# Patient Record
Sex: FEMALE | Race: WHITE | NOT HISPANIC OR LATINO
[De-identification: names, ages, dates, MRNs, and addresses within clinical notes are randomized per-mention and may not be internally consistent; named-entity substitution may affect disease eponyms.]

---

## 2017-06-19 PROBLEM — Z00.00 ENCOUNTER FOR PREVENTIVE HEALTH EXAMINATION: Status: ACTIVE | Noted: 2017-06-19

## 2018-07-05 ENCOUNTER — APPOINTMENT (OUTPATIENT)
Dept: HEART AND VASCULAR | Facility: CLINIC | Age: 73
End: 2018-07-05
Payer: MEDICARE

## 2018-07-05 VITALS
HEART RATE: 98 BPM | HEIGHT: 64 IN | SYSTOLIC BLOOD PRESSURE: 118 MMHG | BODY MASS INDEX: 23.39 KG/M2 | WEIGHT: 137 LBS | DIASTOLIC BLOOD PRESSURE: 76 MMHG | OXYGEN SATURATION: 97 %

## 2018-07-05 DIAGNOSIS — I10 ESSENTIAL (PRIMARY) HYPERTENSION: ICD-10-CM

## 2018-07-05 DIAGNOSIS — Z78.9 OTHER SPECIFIED HEALTH STATUS: ICD-10-CM

## 2018-07-05 DIAGNOSIS — I25.10 ATHEROSCLEROTIC HEART DISEASE OF NATIVE CORONARY ARTERY W/OUT ANGINA PECTORIS: ICD-10-CM

## 2018-07-05 DIAGNOSIS — I25.5 ISCHEMIC CARDIOMYOPATHY: ICD-10-CM

## 2018-07-05 PROCEDURE — 93282 PRGRMG EVAL IMPLANTABLE DFB: CPT

## 2018-07-05 PROCEDURE — 99203 OFFICE O/P NEW LOW 30 MIN: CPT | Mod: 25

## 2018-07-05 RX ORDER — DIGOXIN 250 UG/1
250 TABLET ORAL
Refills: 0 | Status: ACTIVE | COMMUNITY
Start: 2018-07-05

## 2018-07-05 RX ORDER — LOSARTAN POTASSIUM 50 MG/1
50 TABLET, FILM COATED ORAL
Refills: 0 | Status: ACTIVE | COMMUNITY
Start: 2018-07-05

## 2018-07-05 RX ORDER — ASPIRIN 325 MG/1
325 TABLET, FILM COATED ORAL
Refills: 0 | Status: ACTIVE | COMMUNITY
Start: 2018-07-05

## 2018-07-05 RX ORDER — METOPROLOL SUCCINATE 100 MG/1
100 TABLET, EXTENDED RELEASE ORAL
Refills: 0 | Status: ACTIVE | COMMUNITY
Start: 2018-07-05

## 2018-07-05 RX ORDER — LEVOTHYROXINE SODIUM 0.03 MG/1
25 TABLET ORAL
Refills: 0 | Status: ACTIVE | COMMUNITY
Start: 2018-07-05

## 2018-07-05 RX ORDER — SIMVASTATIN 20 MG/1
20 TABLET, FILM COATED ORAL
Refills: 0 | Status: ACTIVE | COMMUNITY
Start: 2018-07-05

## 2018-07-05 RX ORDER — SPIRONOLACTONE 25 MG/1
25 TABLET ORAL
Refills: 0 | Status: ACTIVE | COMMUNITY
Start: 2018-07-05

## 2018-07-10 PROBLEM — I25.5 ISCHEMIC CARDIOMYOPATHY: Status: ACTIVE | Noted: 2018-07-10

## 2018-07-10 PROBLEM — I10 HTN (HYPERTENSION), BENIGN: Status: ACTIVE | Noted: 2018-07-10

## 2018-07-10 PROBLEM — Z78.9 NON-SMOKER: Status: ACTIVE | Noted: 2018-07-10

## 2018-07-10 PROBLEM — I25.10 CAD IN NATIVE ARTERY: Status: ACTIVE | Noted: 2018-07-10

## 2018-11-07 ENCOUNTER — APPOINTMENT (OUTPATIENT)
Dept: HEART AND VASCULAR | Facility: CLINIC | Age: 73
End: 2018-11-07
Payer: MEDICARE

## 2018-11-07 VITALS
HEIGHT: 64 IN | HEART RATE: 107 BPM | BODY MASS INDEX: 24.41 KG/M2 | DIASTOLIC BLOOD PRESSURE: 72 MMHG | SYSTOLIC BLOOD PRESSURE: 119 MMHG | WEIGHT: 143 LBS

## 2018-11-07 PROCEDURE — 93282 PRGRMG EVAL IMPLANTABLE DFB: CPT

## 2018-11-07 PROCEDURE — 99215 OFFICE O/P EST HI 40 MIN: CPT | Mod: 25

## 2018-11-14 ENCOUNTER — APPOINTMENT (OUTPATIENT)
Dept: HEART AND VASCULAR | Facility: CLINIC | Age: 73
End: 2018-11-14
Payer: MEDICARE

## 2018-11-14 VITALS
HEIGHT: 64 IN | SYSTOLIC BLOOD PRESSURE: 113 MMHG | WEIGHT: 143 LBS | BODY MASS INDEX: 24.41 KG/M2 | DIASTOLIC BLOOD PRESSURE: 68 MMHG | HEART RATE: 103 BPM

## 2018-11-14 PROCEDURE — 99215 OFFICE O/P EST HI 40 MIN: CPT | Mod: 25

## 2018-11-14 PROCEDURE — 93282 PRGRMG EVAL IMPLANTABLE DFB: CPT

## 2018-11-15 LAB
ANION GAP SERPL CALC-SCNC: 20 MMOL/L
BASOPHILS # BLD AUTO: 0.01 K/UL
BASOPHILS NFR BLD AUTO: 0.1 %
BUN SERPL-MCNC: 28 MG/DL
CALCIUM SERPL-MCNC: 10 MG/DL
CHLORIDE SERPL-SCNC: 103 MMOL/L
CO2 SERPL-SCNC: 20 MMOL/L
CREAT SERPL-MCNC: 1.41 MG/DL
EOSINOPHIL # BLD AUTO: 0.06 K/UL
EOSINOPHIL NFR BLD AUTO: 0.6 %
GLUCOSE SERPL-MCNC: 122 MG/DL
HCT VFR BLD CALC: 50.6 %
HGB BLD-MCNC: 16 G/DL
IMM GRANULOCYTES NFR BLD AUTO: 0.1 %
LYMPHOCYTES # BLD AUTO: 1.91 K/UL
LYMPHOCYTES NFR BLD AUTO: 19 %
MAN DIFF?: NORMAL
MCHC RBC-ENTMCNC: 30.7 PG
MCHC RBC-ENTMCNC: 31.6 GM/DL
MCV RBC AUTO: 96.9 FL
MONOCYTES # BLD AUTO: 1.02 K/UL
MONOCYTES NFR BLD AUTO: 10.1 %
NEUTROPHILS # BLD AUTO: 7.04 K/UL
NEUTROPHILS NFR BLD AUTO: 70.1 %
NT-PROBNP SERPL-MCNC: 2881 PG/ML
PLATELET # BLD AUTO: 271 K/UL
POTASSIUM SERPL-SCNC: 4.8 MMOL/L
RBC # BLD: 5.22 M/UL
RBC # FLD: 14 %
SODIUM SERPL-SCNC: 143 MMOL/L
WBC # FLD AUTO: 10.05 K/UL

## 2018-11-20 NOTE — DISCUSSION/SUMMARY
[AICD Function Normal] : normal AICD function [FreeTextEntry1] : Ms. Hood is a 73 year old female with HTN, HLD, hypothyroidism, CAD s/p stents and ischemic cardiomyopathy s/p ICD; who presents for follow up.  She complains of increased SOB and fatigue.  She has a known cardiomyopathy and a LBBB with sinus tachycardia on today's EKG.  SHe notes recent blood work and echocardiogram which I will request from Dr. Bai office.  If her labs are ok I would start her on a week of lasix and have her return to the office next week for an assessment.  If her EF is depressed and she has MR I told her I think she may benefit from an upgrade to a BIV ICD.  We discussed that resynchronization therapy could help the way she is feeling.  However, I would like to review her records prior to discussing this in more detail.  Once I obtain these records I will call her and let her know if we will start Lasix for a week.  She knows to call with any questions or concerns.

## 2018-11-20 NOTE — REVIEW OF SYSTEMS
[Feeling Fatigued] : feeling fatigued [see HPI] : see HPI [Shortness Of Breath] : shortness of breath [Dyspnea on exertion] : dyspnea during exertion [Negative] : Heme/Lymph [Fever] : no fever [Chills] : no chills [Chest  Pressure] : no chest pressure [Chest Pain] : no chest pain [Lower Ext Edema] : no extremity edema [Palpitations] : no palpitations [Cough] : no cough [Wheezing] : no wheezing [Coughing Up Blood] : no hemoptysis

## 2018-11-20 NOTE — HISTORY OF PRESENT ILLNESS
[SOB] : dyspnea [Palpitations] : no palpitations [Syncope] : no syncope [Dizziness] : no dizziness [Chest Pain] : no chest pain or discomfort [ICD Shocks] : no recent ICD shocks [Shoulder Pain] : no shoulder pain [Pain at Site] : no pain at device site [Erythema at Site] : no erythema at device site [Swelling at Site] : no swelling at device site [de-identified] : Ms. Hood is a 73 year old female with HTN, HLD, hypothyroidism, CAD s/p stents and ischemic cardiomyopathy s/p ICD; who presents for follow up\par \par She recently established care in our office.  She has a history of CAD and stents and since then was told she had a depressed EF and had an ICD placed in 2005.  \par \par She presents today for routine ICD check and notes that she has increased fatigue and SOB recently.  SHe saw her cardiologist and was told she had severe MR and has an appointment to see a valve doctor.  She states she also has an upcoming stress test and possible angiogram.  She notes some SOB at rest; but mostly with ambulation.  No palpitations, chest pain, syncope, near syncope, orthopnea or peripheral edema.  No device related complaints.  \par  \par \par

## 2018-11-20 NOTE — ADDENDUM
[FreeTextEntry1] : Left patient 2 voicemail and tried calling her another time - obtained records from Dr. Bai office and would like to start her on 20mg of lasix and have her come back into the office next week.  She was asked to call back at her earliest convenience.

## 2018-11-20 NOTE — PHYSICAL EXAM
[General Appearance - Well Developed] : well developed [Normal Appearance] : normal appearance [Well Groomed] : well groomed [General Appearance - Well Nourished] : well nourished [No Deformities] : no deformities [General Appearance - In No Acute Distress] : no acute distress [Respiration, Rhythm And Depth] : normal respiratory rhythm and effort [Exaggerated Use Of Accessory Muscles For Inspiration] : no accessory muscle use [Auscultation Breath Sounds / Voice Sounds] : lungs were clear to auscultation bilaterally [Left Infraclavicular] : left infraclavicular area [Clean] : clean [Dry] : dry [Well-Healed] : well-healed [] : no ischemic changes [FreeTextEntry1] : sinus with rates to 110 [Palpable Crepitus] : no palpable crepitus [Bleeding] : no active bleeding [Foul Odor] : no foul smell [Purulent Drainage] : no purulent drainage [Serosanguineous Drainage] : no serosanquineous drainage [Serous Drainage] : no serous drainage [Erythema] : not erythematous [Warm] : not warm [Tender] : not tender [Indurated] : not indurated [Fluctuant] : not fluctuant

## 2018-11-20 NOTE — PROCEDURE
[No] : not [ICD] : Implantable cardioverter-defibrillator [VVI] : VVI [Voltage: ___ volts] : Voltage was [unfilled] volts [Threshold Testing Performed] : Threshold testing was performed [Lead Imp:  ___ohms] : lead impedance was [unfilled] ohms [Sensing Amplitude ___mv] : sensing amplitude was [unfilled] mv [___V @] : [unfilled] V [___ ms] : [unfilled] ms [None] : none [de-identified] : sinus tachycardia  [de-identified] : Medtronic [de-identified] : Secura [de-identified] : [id number] [de-identified] : 11/2005 [de-identified] : 40 [de-identified] : shock impedance 42 [de-identified] : no events\par  <0.1%

## 2018-11-21 NOTE — DISCUSSION/SUMMARY
[AICD Function Normal] : normal AICD function [FreeTextEntry1] : Ms. Hood is a 73 year old female with HTN, HLD, hypothyroidism, CAD s/p stents and ischemic cardiomyopathy s/p ICD, LBB; who presents for follow up.  She complains of LE edema, SOB and fatigue.  She has a known cardiomyopathy, LBBB and mitral regurgitation.  I discussed this with her cardiologist and I believe she would benefit from an upgrade to a BIV ICD.  I am hopeful that this will improve her MR; but I have told her that we will not know this until we place the LV lead and see how she does.  She is agreeable to proceeding with a BiV upgrade and we discussed the procedure in detail including risks, benefits and alternatives.  GIven her worsening CHF symptoms I have asked her to get blood work and will increase her lasix pending these results.   I have asked her to eliminate salt from her diet and watch her fluid intake.  I have asked her to weigh herself daily and monitor her lower extremity edema.  IF she does not notice that she is urinating more with lasix she will need to see Dr. Marroquin or come back to my office to reassess her medications.   She knows to call with any questions or concerns.

## 2018-11-21 NOTE — PROCEDURE
[No] : not [ICD] : Implantable cardioverter-defibrillator [VVI] : VVI [Voltage: ___ volts] : Voltage was [unfilled] volts [Threshold Testing Performed] : Threshold testing was performed [Lead Imp:  ___ohms] : lead impedance was [unfilled] ohms [Sensing Amplitude ___mv] : sensing amplitude was [unfilled] mv [___V @] : [unfilled] V [___ ms] : [unfilled] ms [None] : none [de-identified] : sinus tachycardia  [de-identified] : Medtronic [de-identified] : Secura [de-identified] : [id number] [de-identified] : 11/2005 [de-identified] : 40 [de-identified] : shock impedance 44 [de-identified] : no events\par  <0.1%

## 2018-11-21 NOTE — HISTORY OF PRESENT ILLNESS
[SOB] : dyspnea [Palpitations] : no palpitations [Syncope] : no syncope [Dizziness] : no dizziness [Chest Pain] : no chest pain or discomfort [ICD Shocks] : no recent ICD shocks [Shoulder Pain] : no shoulder pain [Pain at Site] : no pain at device site [Erythema at Site] : no erythema at device site [Swelling at Site] : no swelling at device site [de-identified] : Ms. Hood is a 73 year old female with HTN, HLD, hypothyroidism, CAD s/p stents and ischemic cardiomyopathy s/p ICD and LBBB; who presents for follow up\par \par She recently established care in our office.  She has a history of CAD and stents and since then was told she had a depressed EF and had an ICD placed in 2005.  \par \par She presented last week for routine ICD check and notes that she has increased fatigue and SOB recently.  SHe saw her cardiologist and was told she had severe MR and has an appointment to see a valve doctor.  She states she also has an upcoming stress test.  She was found to have a new LBBB and lower extremity edema.  We put her on one week of lasix and obtained her echocardiogram to see if she would be a candidate for a BiV upgrade.  \par \par She states since starting the lasix she notes more LE edema.  She still has SOB at rest; but mostly with ambulation.  No palpitations, chest pain, syncope, near syncope, orthopnea.  No device related complaints.  \par  \par

## 2018-11-21 NOTE — PHYSICAL EXAM
[General Appearance - Well Developed] : well developed [Normal Appearance] : normal appearance [Well Groomed] : well groomed [General Appearance - Well Nourished] : well nourished [No Deformities] : no deformities [General Appearance - In No Acute Distress] : no acute distress [Respiration, Rhythm And Depth] : normal respiratory rhythm and effort [Exaggerated Use Of Accessory Muscles For Inspiration] : no accessory muscle use [Auscultation Breath Sounds / Voice Sounds] : lungs were clear to auscultation bilaterally [Left Infraclavicular] : left infraclavicular area [Clean] : clean [Dry] : dry [Well-Healed] : well-healed [] : no ischemic changes [Abdomen Soft] : soft [Cyanosis, Localized] : no localized cyanosis [FreeTextEntry1] : sinus with rates to 110 [Palpable Crepitus] : no palpable crepitus [Bleeding] : no active bleeding [Foul Odor] : no foul smell [Purulent Drainage] : no purulent drainage [Serosanguineous Drainage] : no serosanquineous drainage [Serous Drainage] : no serous drainage [Erythema] : not erythematous [Warm] : not warm [Tender] : not tender [Indurated] : not indurated [Fluctuant] : not fluctuant

## 2018-11-21 NOTE — REVIEW OF SYSTEMS
[Feeling Fatigued] : feeling fatigued [see HPI] : see HPI [Shortness Of Breath] : shortness of breath [Dyspnea on exertion] : dyspnea during exertion [Lower Ext Edema] : lower extremity edema [Negative] : Psychiatric [Fever] : no fever [Chills] : no chills [Chest  Pressure] : no chest pressure [Chest Pain] : no chest pain [Palpitations] : no palpitations [Cough] : no cough [Wheezing] : no wheezing [Coughing Up Blood] : no hemoptysis

## 2018-11-26 ENCOUNTER — INPATIENT (INPATIENT)
Facility: HOSPITAL | Age: 73
LOS: 0 days | Discharge: HOME | DRG: 223 | End: 2018-11-27
Attending: INTERNAL MEDICINE | Admitting: INTERNAL MEDICINE
Payer: COMMERCIAL

## 2018-11-26 VITALS
DIASTOLIC BLOOD PRESSURE: 85 MMHG | RESPIRATION RATE: 18 BRPM | OXYGEN SATURATION: 100 % | SYSTOLIC BLOOD PRESSURE: 130 MMHG | HEART RATE: 60 BPM

## 2018-11-26 DIAGNOSIS — I42.9 CARDIOMYOPATHY, UNSPECIFIED: ICD-10-CM

## 2018-11-26 DIAGNOSIS — Z95.810 PRESENCE OF AUTOMATIC (IMPLANTABLE) CARDIAC DEFIBRILLATOR: Chronic | ICD-10-CM

## 2018-11-26 PROCEDURE — 99223 1ST HOSP IP/OBS HIGH 75: CPT

## 2018-11-26 PROCEDURE — 93641 EP EVL 1/2CHMB PAC CVDFB TST: CPT | Mod: 26

## 2018-11-26 PROCEDURE — 33225 L VENTRIC PACING LEAD ADD-ON: CPT

## 2018-11-26 PROCEDURE — 33249 INSJ/RPLCMT DEFIB W/LEAD(S): CPT

## 2018-11-26 RX ORDER — ACETAMINOPHEN 500 MG
325 TABLET ORAL EVERY 4 HOURS
Qty: 0 | Refills: 0 | Status: DISCONTINUED | OUTPATIENT
Start: 2018-11-26 | End: 2018-11-27

## 2018-11-26 RX ORDER — FUROSEMIDE 40 MG
1 TABLET ORAL
Qty: 0 | Refills: 0 | COMMUNITY

## 2018-11-26 RX ORDER — DIGOXIN 250 MCG
1 TABLET ORAL
Qty: 0 | Refills: 0 | COMMUNITY

## 2018-11-26 RX ORDER — SIMVASTATIN 20 MG/1
20 TABLET, FILM COATED ORAL DAILY
Qty: 0 | Refills: 0 | Status: DISCONTINUED | OUTPATIENT
Start: 2018-11-26 | End: 2018-11-27

## 2018-11-26 RX ORDER — ASPIRIN/CALCIUM CARB/MAGNESIUM 324 MG
325 TABLET ORAL EVERY 24 HOURS
Qty: 0 | Refills: 0 | Status: DISCONTINUED | OUTPATIENT
Start: 2018-11-26 | End: 2018-11-27

## 2018-11-26 RX ORDER — CLOPIDOGREL BISULFATE 75 MG/1
75 TABLET, FILM COATED ORAL DAILY
Qty: 0 | Refills: 0 | Status: DISCONTINUED | OUTPATIENT
Start: 2018-11-26 | End: 2018-11-27

## 2018-11-26 RX ORDER — LEVOTHYROXINE SODIUM 125 MCG
25 TABLET ORAL DAILY
Qty: 0 | Refills: 0 | Status: DISCONTINUED | OUTPATIENT
Start: 2018-11-26 | End: 2018-11-27

## 2018-11-26 RX ORDER — CEFAZOLIN SODIUM 1 G
2000 VIAL (EA) INJECTION EVERY 8 HOURS
Qty: 0 | Refills: 0 | Status: COMPLETED | OUTPATIENT
Start: 2018-11-26 | End: 2018-11-27

## 2018-11-26 RX ORDER — DIGOXIN 250 MCG
0.25 TABLET ORAL DAILY
Qty: 0 | Refills: 0 | Status: DISCONTINUED | OUTPATIENT
Start: 2018-11-26 | End: 2018-11-27

## 2018-11-26 RX ORDER — METOPROLOL TARTRATE 50 MG
100 TABLET ORAL DAILY
Qty: 0 | Refills: 0 | Status: DISCONTINUED | OUTPATIENT
Start: 2018-11-26 | End: 2018-11-27

## 2018-11-26 RX ORDER — SPIRONOLACTONE 25 MG/1
1 TABLET, FILM COATED ORAL
Qty: 0 | Refills: 0 | COMMUNITY

## 2018-11-26 RX ORDER — ASPIRIN/CALCIUM CARB/MAGNESIUM 324 MG
1 TABLET ORAL
Qty: 0 | Refills: 0 | COMMUNITY

## 2018-11-26 RX ORDER — SIMVASTATIN 20 MG/1
1 TABLET, FILM COATED ORAL
Qty: 0 | Refills: 0 | COMMUNITY

## 2018-11-26 RX ORDER — METOPROLOL TARTRATE 50 MG
1 TABLET ORAL
Qty: 0 | Refills: 0 | COMMUNITY

## 2018-11-26 RX ORDER — CLOPIDOGREL BISULFATE 75 MG/1
1 TABLET, FILM COATED ORAL
Qty: 0 | Refills: 0 | COMMUNITY

## 2018-11-26 RX ORDER — FUROSEMIDE 40 MG
40 TABLET ORAL DAILY
Qty: 0 | Refills: 0 | Status: DISCONTINUED | OUTPATIENT
Start: 2018-11-26 | End: 2018-11-27

## 2018-11-26 RX ORDER — SPIRONOLACTONE 25 MG/1
25 TABLET, FILM COATED ORAL DAILY
Qty: 0 | Refills: 0 | Status: DISCONTINUED | OUTPATIENT
Start: 2018-11-26 | End: 2018-11-27

## 2018-11-26 RX ORDER — LOSARTAN POTASSIUM 100 MG/1
1 TABLET, FILM COATED ORAL
Qty: 0 | Refills: 0 | COMMUNITY

## 2018-11-26 RX ORDER — LEVOTHYROXINE SODIUM 125 MCG
1 TABLET ORAL
Qty: 0 | Refills: 0 | COMMUNITY

## 2018-11-26 RX ORDER — CEFAZOLIN SODIUM 1 G
1000 VIAL (EA) INJECTION ONCE
Qty: 0 | Refills: 0 | Status: DISCONTINUED | OUTPATIENT
Start: 2018-11-26 | End: 2018-11-27

## 2018-11-26 RX ORDER — LOSARTAN POTASSIUM 100 MG/1
50 TABLET, FILM COATED ORAL DAILY
Qty: 0 | Refills: 0 | Status: DISCONTINUED | OUTPATIENT
Start: 2018-11-26 | End: 2018-11-27

## 2018-11-26 RX ADMIN — SIMVASTATIN 20 MILLIGRAM(S): 20 TABLET, FILM COATED ORAL at 20:53

## 2018-11-26 RX ADMIN — Medication 100 MILLIGRAM(S): at 20:53

## 2018-11-26 NOTE — H&P ADULT - ASSESSMENT
Ms. Hood is a 73 year old female with HTN, HLD, hypothyroidism, CAD s/p stents, MR and ischemic cardiomyopathy s/p ICD and LBBB; who presents for elective upgrade to a BiV ICD.

## 2018-11-26 NOTE — H&P ADULT - HISTORY OF PRESENT ILLNESS
Ms. Hood is a 73 year old female with HTN, HLD, hypothyroidism, CAD s/p stents, MR and ischemic cardiomyopathy s/p ICD and LBBB; who presents for elective upgrade to a BiV ICD.     She recently established care in our office. She has a history of CAD and stents and since then was told she had a depressed EF and had an ICD placed in 2005.     She presented last month to the office for a routine ICD check and notes that she has increased fatigue and SOB recently. She saw her cardiologist and was told she had severe MR and has an appointment to see a valve doctor.  She was found to have a new LBBB and lower extremity edema. She was initially placed on 20mg of Lasix a day without a significant improvement in the way she felt.  This was increased to 40mg daily and she notes an improvement in her breathing and LE edema.      No palpitations, chest pain, syncope, near syncope, orthopnea. No device related complaints. She now presents for elective upgrade to a BIV ICD.       Last office device check 11/14/18  Procedure  Device Check The patient is not pacemaker dependent. sinus tachycardia   Device Type: Implantable cardioverter-defibrillator   Pacemaker/ICD : [employer].   Model: Secura. Serial Number: [id number]. Date of Implant: 11/2005.   Mode: VVI. Rate: 40.   Battery Status: Voltage was 2.63 volts   Measurement: Threshold testing was performed.   Rt Ventricle:. lead impedance was 342 ohms. sensing amplitude was 8.4 mv. Pacing Threshold: 0.75 V @ 0.4 ms.   Program Changes: none and shock impedance 44.   Comments:. no events   <0.1

## 2018-11-27 ENCOUNTER — TRANSCRIPTION ENCOUNTER (OUTPATIENT)
Age: 73
End: 2018-11-27

## 2018-11-27 VITALS — TEMPERATURE: 97 F

## 2018-11-27 LAB
ANION GAP SERPL CALC-SCNC: 12 MMOL/L — SIGNIFICANT CHANGE UP (ref 5–17)
BUN SERPL-MCNC: 30 MG/DL — HIGH (ref 7–23)
CALCIUM SERPL-MCNC: 9 MG/DL — SIGNIFICANT CHANGE UP (ref 8.4–10.5)
CHLORIDE SERPL-SCNC: 106 MMOL/L — SIGNIFICANT CHANGE UP (ref 96–108)
CO2 SERPL-SCNC: 24 MMOL/L — SIGNIFICANT CHANGE UP (ref 22–31)
CREAT SERPL-MCNC: 1.24 MG/DL — SIGNIFICANT CHANGE UP (ref 0.5–1.3)
GLUCOSE SERPL-MCNC: 89 MG/DL — SIGNIFICANT CHANGE UP (ref 70–99)
HCT VFR BLD CALC: 38.7 % — SIGNIFICANT CHANGE UP (ref 34.5–45)
HGB BLD-MCNC: 12.4 G/DL — SIGNIFICANT CHANGE UP (ref 11.5–15.5)
MCHC RBC-ENTMCNC: 31.2 PG — SIGNIFICANT CHANGE UP (ref 27–34)
MCHC RBC-ENTMCNC: 32 G/DL — SIGNIFICANT CHANGE UP (ref 32–36)
MCV RBC AUTO: 97.2 FL — SIGNIFICANT CHANGE UP (ref 80–100)
PLATELET # BLD AUTO: 147 K/UL — LOW (ref 150–400)
POTASSIUM SERPL-MCNC: 5.1 MMOL/L — SIGNIFICANT CHANGE UP (ref 3.5–5.3)
POTASSIUM SERPL-SCNC: 5.1 MMOL/L — SIGNIFICANT CHANGE UP (ref 3.5–5.3)
RBC # BLD: 3.98 M/UL — SIGNIFICANT CHANGE UP (ref 3.8–5.2)
RBC # FLD: 13.1 % — SIGNIFICANT CHANGE UP (ref 10.3–16.9)
SODIUM SERPL-SCNC: 142 MMOL/L — SIGNIFICANT CHANGE UP (ref 135–145)
WBC # BLD: 8.9 K/UL — SIGNIFICANT CHANGE UP (ref 3.8–10.5)
WBC # FLD AUTO: 8.9 K/UL — SIGNIFICANT CHANGE UP (ref 3.8–10.5)

## 2018-11-27 PROCEDURE — 99239 HOSP IP/OBS DSCHRG MGMT >30: CPT

## 2018-11-27 PROCEDURE — 71046 X-RAY EXAM CHEST 2 VIEWS: CPT | Mod: 26

## 2018-11-27 RX ADMIN — CLOPIDOGREL BISULFATE 75 MILLIGRAM(S): 75 TABLET, FILM COATED ORAL at 10:25

## 2018-11-27 RX ADMIN — SPIRONOLACTONE 25 MILLIGRAM(S): 25 TABLET, FILM COATED ORAL at 06:10

## 2018-11-27 RX ADMIN — Medication 25 MICROGRAM(S): at 06:10

## 2018-11-27 RX ADMIN — LOSARTAN POTASSIUM 50 MILLIGRAM(S): 100 TABLET, FILM COATED ORAL at 06:10

## 2018-11-27 RX ADMIN — Medication 325 MILLIGRAM(S): at 06:10

## 2018-11-27 RX ADMIN — Medication 100 MILLIGRAM(S): at 06:10

## 2018-11-27 RX ADMIN — Medication 40 MILLIGRAM(S): at 06:10

## 2018-11-27 RX ADMIN — Medication 0.25 MILLIGRAM(S): at 06:10

## 2018-11-27 NOTE — DISCHARGE NOTE ADULT - CARE PLAN
Principal Discharge DX:	ICD (implantable cardioverter-defibrillator) in place  Goal:	s/p BIV upgrade  Assessment and plan of treatment:	As discussed bruising is normal however if you notice any worsening firmness at site or any bleeding, discharge or issues at site or fever/chills please call [phone].  You can get the incision wet tomorrow but please do not pick at or rub off the glue.  Try to avoid lifting your L arm above your shoulders or any heavy lifting with your L arm

## 2018-11-27 NOTE — DISCHARGE NOTE ADULT - HOSPITAL COURSE
Ms. Hood is a 73 year old female with HTN, HLD, hypothyroidism, CAD s/p stents, MR and ischemic cardiomyopathy s/p ICD and LBBB; who presents for elective upgrade to a BiV ICD.   She underwent a successful BIV upgrade on 11/26.  Post procedure she developed a hematoma and a pressure dressing was placed overnight.  This was removed in elaine am and she has a small residual hematoma that is stable with + ecchymosis  She did well overnight.  Tele paced with some NSR with RV pacing and PVCs.  Lungs CTAB.  S1S2 RRR.  CXR done and no PNX seen (d/w radiology).  No changes in her medications.  She was given explicit instructions about activity limitations over the next few weeks and noted understanding.  She is stable for discharge and has a follow up appointment    [facility]   [id number]  presenting rhythm APVP  she is not ppm dependant  Paced 97%  battery initializing   DDD 60/120  RA sense 3.6, impedance 418, threshold 0.25V@0.4ms  RV sense 7.8mV, impedance 342, threshold 0.5V@0.4ms  shock impedance 34  LV impedance 361, threshold 1V@0.4ms  no events   no parameter changes

## 2018-11-27 NOTE — DISCHARGE NOTE ADULT - PLAN OF CARE
s/p BIV upgrade As discussed bruising is normal however if you notice any worsening firmness at site or any bleeding, discharge or issues at site or fever/chills please call [phone].  You can get the incision wet tomorrow but please do not pick at or rub off the glue.  Try to avoid lifting your L arm above your shoulders or any heavy lifting with your L arm

## 2018-11-27 NOTE — DISCHARGE NOTE ADULT - CARE PROVIDER_API CALL
Akshat Gooden), Cardiac Electrophysiology; Cardiology; Cardiovascular Disease  [address] [place] [address]  Phone: [phone]  Fax: [phone]

## 2018-11-27 NOTE — DISCHARGE NOTE ADULT - MEDICATION SUMMARY - MEDICATIONS TO TAKE
I will START or STAY ON the medications listed below when I get home from the hospital:    spironolactone 25 mg oral tablet  -- 1 tab(s) by mouth once a day  -- Indication: For Cardiomyopathy    aspirin 325 mg oral tablet  -- 1 tab(s) by mouth once a day  -- Indication: For CAD (coronary artery disease)    losartan 50 mg oral tablet  -- 1 tab(s) by mouth once a day  -- Indication: For Cardiomyopathy    digoxin 250 mcg (0.25 mg) oral tablet  -- 1 tab(s) by mouth once a day  -- Indication: For Cardiomyopathy    Zocor 20 mg oral tablet  -- 1 tab(s) by mouth once a day (at bedtime)  -- Indication: For Hyperlipidemia    Plavix 75 mg oral tablet  -- 1 tab(s) by mouth once a day  -- Indication: For CAD (coronary artery disease)    Toprol- mg oral tablet, extended release  -- 1 tab(s) by mouth once a day  -- Indication: For CAD (coronary artery disease)    Lasix 40 mg oral tablet  -- 1 tab(s) by mouth once a day  -- Indication: For Cardiomyopathy    levothyroxine 25 mcg (0.025 mg) oral tablet  -- 1 tab(s) by mouth once a day  -- Indication: For Hypothyroid

## 2018-11-27 NOTE — DISCHARGE NOTE ADULT - INSTRUCTIONS
you should follow a low sodium diet (2-3 grams daily) restrict your fluid intake to 1.5L a day.  Weight yourself every day at the same time and if you gain more then 2lbs in 1 day or 5 lbs in 1 week or you notice your legs swelling please call Dr. Marroquin or our office.

## 2018-11-27 NOTE — DISCHARGE NOTE ADULT - PATIENT PORTAL LINK FT
You can access the [facility], offered by [facility], by registering with the following website: [email or web address]

## 2018-11-28 ENCOUNTER — TRANSCRIPTION ENCOUNTER (OUTPATIENT)
Age: 73
End: 2018-11-28

## 2018-12-04 DIAGNOSIS — Z91.018 ALLERGY TO OTHER FOODS: ICD-10-CM

## 2018-12-04 DIAGNOSIS — I34.0 NONRHEUMATIC MITRAL (VALVE) INSUFFICIENCY: ICD-10-CM

## 2018-12-04 DIAGNOSIS — Z79.82 LONG TERM (CURRENT) USE OF ASPIRIN: ICD-10-CM

## 2018-12-04 DIAGNOSIS — I11.0 HYPERTENSIVE HEART DISEASE WITH HEART FAILURE: ICD-10-CM

## 2018-12-04 DIAGNOSIS — L76.32 POSTPROCEDURAL HEMATOMA OF SKIN AND SUBCUTANEOUS TISSUE FOLLOWING OTHER PROCEDURE: ICD-10-CM

## 2018-12-04 DIAGNOSIS — I42.8 OTHER CARDIOMYOPATHIES: ICD-10-CM

## 2018-12-04 DIAGNOSIS — I44.7 LEFT BUNDLE-BRANCH BLOCK, UNSPECIFIED: ICD-10-CM

## 2018-12-04 DIAGNOSIS — I50.9 HEART FAILURE, UNSPECIFIED: ICD-10-CM

## 2018-12-04 DIAGNOSIS — E03.9 HYPOTHYROIDISM, UNSPECIFIED: ICD-10-CM
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
## 2018-12-04 DIAGNOSIS — E78.5 HYPERLIPIDEMIA, UNSPECIFIED: ICD-10-CM
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
## 2018-12-04 DIAGNOSIS — Z95.5 PRESENCE OF CORONARY ANGIOPLASTY IMPLANT AND GRAFT: ICD-10-CM

## 2018-12-04 DIAGNOSIS — I25.10 ATHEROSCLEROTIC HEART DISEASE OF NATIVE CORONARY ARTERY WITHOUT ANGINA PECTORIS: ICD-10-CM

## 2018-12-04 DIAGNOSIS — Z79.02 LONG TERM (CURRENT) USE OF ANTITHROMBOTICS/ANTIPLATELETS: ICD-10-CM

## 2018-12-15 PROCEDURE — 71046 X-RAY EXAM CHEST 2 VIEWS: CPT

## 2018-12-15 PROCEDURE — C1730: CPT

## 2018-12-15 PROCEDURE — C1892: CPT

## 2018-12-15 PROCEDURE — C1769: CPT

## 2018-12-15 PROCEDURE — C1882: CPT

## 2018-12-15 PROCEDURE — C1889: CPT

## 2018-12-15 PROCEDURE — C1898: CPT

## 2018-12-15 PROCEDURE — 85027 COMPLETE CBC AUTOMATED: CPT

## 2018-12-15 PROCEDURE — 80048 BASIC METABOLIC PNL TOTAL CA: CPT

## 2018-12-15 PROCEDURE — C1894: CPT

## 2018-12-15 PROCEDURE — 36415 COLL VENOUS BLD VENIPUNCTURE: CPT

## 2018-12-15 PROCEDURE — C1900: CPT

## 2018-12-20 ENCOUNTER — APPOINTMENT (OUTPATIENT)
Dept: HEART AND VASCULAR | Facility: CLINIC | Age: 73
End: 2018-12-20
Payer: MEDICARE

## 2018-12-20 ENCOUNTER — NON-APPOINTMENT (OUTPATIENT)
Age: 73
End: 2018-12-20

## 2018-12-20 VITALS
HEIGHT: 64 IN | BODY MASS INDEX: 21.51 KG/M2 | DIASTOLIC BLOOD PRESSURE: 67 MMHG | WEIGHT: 126 LBS | HEART RATE: 81 BPM | SYSTOLIC BLOOD PRESSURE: 124 MMHG

## 2018-12-20 PROCEDURE — 93284 PRGRMG EVAL IMPLANTABLE DFB: CPT

## 2019-01-04 NOTE — HISTORY OF PRESENT ILLNESS
[Palpitations] : no palpitations [SOB] : no dyspnea [Syncope] : no syncope [Dizziness] : no dizziness [Chest Pain] : no chest pain or discomfort [ICD Shocks] : no recent ICD shocks [Shoulder Pain] : no shoulder pain [Pain at Site] : no pain at device site [Erythema at Site] : no erythema at device site [Swelling at Site] : no swelling at device site [de-identified] : Ms. Hood is a 73 year old female with HTN, HLD, hypothyroidism, CAD s/p stents and ischemic cardiomyopathy s/p ICD and LBBB s/p upgrade to a BiV ICD 11/26/18 and now presents for follow up\par \par She has a history of CAD and stents and since then was told she had a depressed EF and had an ICD placed in 2005.  She was seen in our office 10/18 for a routine ICD check and notes that she has increased fatigue and SOB.  SHe saw her cardiologist and was told she had severe MR and has an appointment to see a valve doctor.  She was found to have a new LBBB and lower extremity edema.  We put her on one week of lasix and we obtained her echocardiogram to see if she would be a candidate for a BiV upgrade.  \par \par She underwent a upgrade to a BIV ICD 11/2018 and since discharge from the hospital she notes more energy, less SOB and less LE edema.  Her lasix dose has been decrease and overall she notes a significant improvement.  No device related complaints.  No syncope, near syncope, palpitations or orthopnea.  \par  \par

## 2019-01-04 NOTE — REVIEW OF SYSTEMS
[see HPI] : see HPI [Negative] : Heme/Lymph [Fever] : no fever [Chills] : no chills [Feeling Fatigued] : not feeling fatigued [Shortness Of Breath] : no shortness of breath [Dyspnea on exertion] : not dyspnea during exertion [Chest  Pressure] : no chest pressure [Chest Pain] : no chest pain [Lower Ext Edema] : no extremity edema [Palpitations] : no palpitations [Cough] : no cough [Wheezing] : no wheezing [Coughing Up Blood] : no hemoptysis

## 2019-01-04 NOTE — PROCEDURE
[No] : not [VVI] : VVI [Threshold Testing Performed] : Threshold testing was performed [None] : none [NSR] : normal sinus rhythm [CRT-D] : Cardiac resynchronization therapy defibrillator [Sensing Amplitude ___mv] : sensing amplitude was [unfilled] mv [Lead Imp:  ___ohms] : lead impedance was [unfilled] ohms [___V @] : [unfilled] V [___ ms] : [unfilled] ms [Asense-Vsense ___ %] : Asense-Vsense [unfilled]% [Asense-Vpace ___ %] : Asense-Vpace [unfilled]% [Apace-Vsense ___ %] : Apace-Vsense [unfilled]% [Apace-Vpace ___ %] : Apace-Vpace [unfilled]% [de-identified] : Medtronic [de-identified] : Sasha NAVAS [de-identified] : [id number] [de-identified] : 11/26/18 [de-identified] : 40 [de-identified] : 9.2 years [de-identified] : shock impedance 41 [de-identified] : no events\par

## 2019-01-04 NOTE — DISCUSSION/SUMMARY
[AICD Function Normal] : normal AICD function [FreeTextEntry1] : Ms. Hood is a 73 year old female with HTN, HLD, hypothyroidism, CAD s/p stents and ischemic cardiomyopathy s/p ICD and LBBB s/p upgrade to a BiV ICD 11/26/18 and now presents for follow up.  Device incision is well healed.  INterrogation reveals normal function and all measured data is within normal limits.  She notes a significant improvement in the way she is feeling and has clear lungs, less peripheral edema on exam today.  She is no longer in sinus tachycardia; which had been her presenting rhythm on her last few visits.  I told her I am hopeful that she will continue to do well and we will see an improvement in her EF.  I have asked her to get an echocardiogram with her primary cardiologist in 2-3 months to evaluate this.  She will follow up with me in 4 months or sooner if needed.  At that time we will decrease outputs.  She knows to call with any questions or concerns.

## 2019-01-04 NOTE — PHYSICAL EXAM
[General Appearance - Well Developed] : well developed [Normal Appearance] : normal appearance [Well Groomed] : well groomed [General Appearance - Well Nourished] : well nourished [No Deformities] : no deformities [General Appearance - In No Acute Distress] : no acute distress [Respiration, Rhythm And Depth] : normal respiratory rhythm and effort [Exaggerated Use Of Accessory Muscles For Inspiration] : no accessory muscle use [Auscultation Breath Sounds / Voice Sounds] : lungs were clear to auscultation bilaterally [Left Infraclavicular] : left infraclavicular area [Clean] : clean [Dry] : dry [Well-Healed] : well-healed [] : no ischemic changes [Heart Rate And Rhythm] : heart rate and rhythm were normal [Heart Sounds] : normal S1 and S2 [FreeTextEntry1] : anxious [Palpable Crepitus] : no palpable crepitus [Bleeding] : no active bleeding [Foul Odor] : no foul smell [Purulent Drainage] : no purulent drainage [Serosanguineous Drainage] : no serosanquineous drainage [Serous Drainage] : no serous drainage [Erythema] : not erythematous [Warm] : not warm [Tender] : not tender [Indurated] : not indurated [Fluctuant] : not fluctuant

## 2019-03-04 ENCOUNTER — TRANSCRIPTION ENCOUNTER (OUTPATIENT)
Age: 74
End: 2019-03-04

## 2019-03-15 PROBLEM — I25.10 ATHEROSCLEROTIC HEART DISEASE OF NATIVE CORONARY ARTERY WITHOUT ANGINA PECTORIS: Chronic | Status: ACTIVE | Noted: 2018-11-26

## 2019-03-15 PROBLEM — E03.9 HYPOTHYROIDISM, UNSPECIFIED: Chronic | Status: ACTIVE | Noted: 2018-11-26

## 2019-03-15 PROBLEM — I42.9 CARDIOMYOPATHY, UNSPECIFIED: Chronic | Status: ACTIVE | Noted: 2018-11-26

## 2019-03-15 PROBLEM — I34.0 NONRHEUMATIC MITRAL (VALVE) INSUFFICIENCY: Chronic | Status: ACTIVE | Noted: 2018-11-26

## 2019-04-03 ENCOUNTER — APPOINTMENT (OUTPATIENT)
Dept: HEART AND VASCULAR | Facility: CLINIC | Age: 74
End: 2019-04-03
Payer: MEDICARE

## 2019-04-03 VITALS
DIASTOLIC BLOOD PRESSURE: 61 MMHG | BODY MASS INDEX: 22.2 KG/M2 | WEIGHT: 130 LBS | HEIGHT: 64 IN | HEART RATE: 63 BPM | SYSTOLIC BLOOD PRESSURE: 115 MMHG

## 2019-04-03 DIAGNOSIS — I48.0 PAROXYSMAL ATRIAL FIBRILLATION: ICD-10-CM

## 2019-04-03 PROCEDURE — 93284 PRGRMG EVAL IMPLANTABLE DFB: CPT

## 2019-04-03 PROCEDURE — 99214 OFFICE O/P EST MOD 30 MIN: CPT | Mod: 25

## 2019-04-03 RX ORDER — CLOPIDOGREL 75 MG/1
75 TABLET, FILM COATED ORAL
Refills: 0 | Status: DISCONTINUED | COMMUNITY
Start: 2018-07-05 | End: 2019-04-03

## 2019-04-10 LAB
ANION GAP SERPL CALC-SCNC: 12 MMOL/L
BASOPHILS # BLD AUTO: 0.02 K/UL
BASOPHILS NFR BLD AUTO: 0.2 %
BUN SERPL-MCNC: 25 MG/DL
CALCIUM SERPL-MCNC: 10.4 MG/DL
CHLORIDE SERPL-SCNC: 104 MMOL/L
CO2 SERPL-SCNC: 25 MMOL/L
CREAT SERPL-MCNC: 1.19 MG/DL
EOSINOPHIL # BLD AUTO: 0.31 K/UL
EOSINOPHIL NFR BLD AUTO: 3.3 %
GLUCOSE SERPL-MCNC: 96 MG/DL
HCT VFR BLD CALC: 42.4 %
HGB BLD-MCNC: 13.6 G/DL
IMM GRANULOCYTES NFR BLD AUTO: 0.2 %
LYMPHOCYTES # BLD AUTO: 2.04 K/UL
LYMPHOCYTES NFR BLD AUTO: 21.8 %
MAN DIFF?: NORMAL
MCHC RBC-ENTMCNC: 31.5 PG
MCHC RBC-ENTMCNC: 32.1 GM/DL
MCV RBC AUTO: 98.1 FL
MONOCYTES # BLD AUTO: 1.16 K/UL
MONOCYTES NFR BLD AUTO: 12.4 %
NEUTROPHILS # BLD AUTO: 5.8 K/UL
NEUTROPHILS NFR BLD AUTO: 62.1 %
PLATELET # BLD AUTO: 237 K/UL
POTASSIUM SERPL-SCNC: 5.2 MMOL/L
RBC # BLD: 4.32 M/UL
RBC # FLD: 12.7 %
SODIUM SERPL-SCNC: 141 MMOL/L
TSH SERPL-ACNC: 2.89 UIU/ML
WBC # FLD AUTO: 9.35 K/UL

## 2019-04-16 NOTE — REVIEW OF SYSTEMS
[see HPI] : see HPI [Negative] : Heme/Lymph [Fever] : no fever [Recent Weight Gain (___ Lbs)] : no recent weight gain [Chills] : no chills [Feeling Fatigued] : not feeling fatigued [Shortness Of Breath] : no shortness of breath [Recent Weight Loss (___ Lbs)] : no recent weight loss [Dyspnea on exertion] : not dyspnea during exertion [Chest  Pressure] : no chest pressure [Chest Pain] : no chest pain [Lower Ext Edema] : no extremity edema [Palpitations] : no palpitations [Cough] : no cough [Wheezing] : no wheezing [Coughing Up Blood] : no hemoptysis

## 2019-04-16 NOTE — DISCUSSION/SUMMARY
[AICD Function Normal] : normal AICD function [FreeTextEntry1] :  Ms. Hood is a pleasant 73 year old female with HTN, HLD, hypothyroidism, CAD s/p stents and ischemic cardiomyopathy s/p ICD and LBBB s/p upgrade to a BiV ICD 11/26/18 and now presents for follow up.  ICD check reveals normal function.  All measured data is within normal limits.  She notes a significant improvement in the way she feels since upgrade.  She has a few short episodes of atrial fibrillation and one lasting longer then an hour.  She is asymptomatic form this.  We discussed in great detail what atrial fibrillation is and the association with stroke.  Her CHADSVASC score is at least 6 and she should   Her stents are more then 12 years old and she will stop plavix and stay on aspirin with eliquis.  I have asked her to get blood work to assure her Cr and H/H are ok for full dose 5mg BID of eliquis.  She is in agreement with this.  She will follow up in 3 months or sooner if needed and knows to call with any questions or concerns.  \par

## 2019-04-16 NOTE — PHYSICAL EXAM
[General Appearance - Well Developed] : well developed [Normal Appearance] : normal appearance [Well Groomed] : well groomed [General Appearance - Well Nourished] : well nourished [No Deformities] : no deformities [General Appearance - In No Acute Distress] : no acute distress [Heart Rate And Rhythm] : heart rate and rhythm were normal [Heart Sounds] : normal S1 and S2 [Respiration, Rhythm And Depth] : normal respiratory rhythm and effort [Exaggerated Use Of Accessory Muscles For Inspiration] : no accessory muscle use [Auscultation Breath Sounds / Voice Sounds] : lungs were clear to auscultation bilaterally [Left Infraclavicular] : left infraclavicular area [Clean] : clean [Dry] : dry [Well-Healed] : well-healed [] : no ischemic changes [Edema] : no peripheral edema present [Cyanosis, Localized] : no localized cyanosis [FreeTextEntry1] : anxious [Palpable Crepitus] : no palpable crepitus [Bleeding] : no active bleeding [Foul Odor] : no foul smell [Purulent Drainage] : no purulent drainage [Serous Drainage] : no serous drainage [Serosanguineous Drainage] : no serosanquineous drainage [Erythema] : not erythematous [Warm] : not warm [Indurated] : not indurated [Tender] : not tender [Fluctuant] : not fluctuant

## 2019-04-16 NOTE — HISTORY OF PRESENT ILLNESS
[Palpitations] : no palpitations [SOB] : no dyspnea [Syncope] : no syncope [Dizziness] : no dizziness [Chest Pain] : no chest pain or discomfort [ICD Shocks] : no recent ICD shocks [Shoulder Pain] : no shoulder pain [Pain at Site] : no pain at device site [Erythema at Site] : no erythema at device site [Swelling at Site] : no swelling at device site [de-identified] : Ms. Hood is a pleasant 73 year old female with HTN, HLD, hypothyroidism, CAD s/p stents and ischemic cardiomyopathy s/p ICD and LBBB s/p upgrade to a BiV ICD 11/26/18 and now presents for follow up\par \par She has a history of CAD and stents  >12 years ago.  Since then was told she had a depressed EF and had an ICD placed in 2005.  She was seen in our office 10/18 for a routine ICD check and notes that she has increased fatigue and SOB.  She saw her cardiologist and was told she had severe MR and has an appointment to see a valve doctor.  She was found to have a new LBBB and lower extremity edema.  We put her on one week of lasix and we obtained her echocardiogram to see if she would be a candidate for a BiV upgrade.  \par \par She underwent a upgrade to a BIV ICD 11/2018.  Ever since then she notes more energy, less SOB and less LE edema.  Her lasix dose has been decrease and overall she notes a significant improvement.  No device related complaints.  No syncope, near syncope, palpitations chest pain or orthopnea.  No bleeding issues.   \par

## 2019-04-16 NOTE — PROCEDURE
[No] : not [NSR] : normal sinus rhythm [CRT-D] : Cardiac resynchronization therapy defibrillator [Threshold Testing Performed] : Threshold testing was performed [Sensing Amplitude ___mv] : sensing amplitude was [unfilled] mv [Lead Imp:  ___ohms] : lead impedance was [unfilled] ohms [___V @] : [unfilled] V [___ ms] : [unfilled] ms [None] : none [Asense-Vsense ___ %] : Asense-Vsense [unfilled]% [Asense-Vpace ___ %] : Asense-Vpace [unfilled]% [Apace-Vsense ___ %] : Apace-Vsense [unfilled]% [Apace-Vpace ___ %] : Apace-Vpace [unfilled]% [DDD] : DDD [de-identified] : Medtronic [de-identified] : Sasha NAVAS [de-identified] : [id number] [de-identified] : 60/120 [de-identified] : 11/26/18 [de-identified] : 8.6 years [de-identified] : shock impedance 44 [de-identified] : episodes of afib longest >1 hour - burden 0.2%\par optivol below threshold / stable

## 2019-06-05 ENCOUNTER — APPOINTMENT (OUTPATIENT)
Dept: HEART AND VASCULAR | Facility: CLINIC | Age: 74
End: 2019-06-05
Payer: MEDICARE

## 2019-06-05 VITALS — SYSTOLIC BLOOD PRESSURE: 126 MMHG | DIASTOLIC BLOOD PRESSURE: 67 MMHG | HEART RATE: 72 BPM

## 2019-06-05 PROCEDURE — 93284 PRGRMG EVAL IMPLANTABLE DFB: CPT

## 2019-06-05 RX ORDER — FUROSEMIDE 80 MG/1
80 TABLET ORAL DAILY
Qty: 30 | Refills: 0 | Status: DISCONTINUED | COMMUNITY
Start: 2018-11-07 | End: 2019-06-05

## 2019-06-10 NOTE — REVIEW OF SYSTEMS
[see HPI] : see HPI [Negative] : Heme/Lymph [Fever] : no fever [Recent Weight Gain (___ Lbs)] : no recent weight gain [Chills] : no chills [Feeling Fatigued] : not feeling fatigued [Shortness Of Breath] : no shortness of breath [Recent Weight Loss (___ Lbs)] : no recent weight loss [Chest  Pressure] : no chest pressure [Dyspnea on exertion] : not dyspnea during exertion [Chest Pain] : no chest pain [Lower Ext Edema] : no extremity edema [Palpitations] : no palpitations [Cough] : no cough [Wheezing] : no wheezing [Coughing Up Blood] : no hemoptysis

## 2019-06-10 NOTE — HISTORY OF PRESENT ILLNESS
[Palpitations] : no palpitations [SOB] : no dyspnea [Syncope] : no syncope [Dizziness] : no dizziness [ICD Shocks] : no recent ICD shocks [Chest Pain] : no chest pain or discomfort [Shoulder Pain] : no shoulder pain [Pain at Site] : no pain at device site [Erythema at Site] : no erythema at device site [Swelling at Site] : no swelling at device site [de-identified] : Ms. Hood is a pleasant 73 year old female with HTN, HLD, hypothyroidism, CAD s/p stents and ischemic cardiomyopathy s/p ICD and LBBB s/p upgrade to a BiV ICD 11/26/18 and recently diagnosed paroxysmal atrial fibrillation, who now presents for follow up\par \par She has a history of CAD and stents  >12 years ago.  Since then was told she had a depressed EF and had an ICD placed in 2005.  She was seen in our office 10/18 for a routine ICD check and noted that she has increased fatigue and SOB.  She saw her cardiologist and was told she had severe MR and has an appointment to see a valve doctor.  She was found to have a new LBBB and lower extremity edema.  We put her on one week of lasix and we obtained her echocardiogram to see if she would be a candidate for a BiV upgrade.  \par \par She underwent a upgrade to a BIV ICD 11/2018.  Ever since then she notes more energy, less SOB and no LE edema.  No device related complaints.  No syncope, near syncope, palpitations chest pain or orthopnea.   She is now on Eliquis without issues.  \par

## 2019-06-10 NOTE — PROCEDURE
[No] : not [NSR] : normal sinus rhythm [DDD] : DDD [CRT-D] : Cardiac resynchronization therapy defibrillator [Threshold Testing Performed] : Threshold testing was performed [Sensing Amplitude ___mv] : sensing amplitude was [unfilled] mv [Lead Imp:  ___ohms] : lead impedance was [unfilled] ohms [___V @] : [unfilled] V [___ ms] : [unfilled] ms [None] : none [Asense-Vsense ___ %] : Asense-Vsense [unfilled]% [Asense-Vpace ___ %] : Asense-Vpace [unfilled]% [Apace-Vsense ___ %] : Apace-Vsense [unfilled]% [Apace-Vpace ___ %] : Apace-Vpace [unfilled]% [de-identified] : Medtronic [de-identified] : Sasha NAVAS [de-identified] : [id number] [de-identified] : 11/26/18 [de-identified] : 60/120 [de-identified] : 8.8 years [de-identified] : no recurrent afib \par optivol below threshold / stable [de-identified] : shock impedance 42

## 2019-06-10 NOTE — PHYSICAL EXAM
[General Appearance - Well Developed] : well developed [Normal Appearance] : normal appearance [Well Groomed] : well groomed [General Appearance - Well Nourished] : well nourished [No Deformities] : no deformities [General Appearance - In No Acute Distress] : no acute distress [Heart Rate And Rhythm] : heart rate and rhythm were normal [Edema] : no peripheral edema present [Heart Sounds] : normal S1 and S2 [Respiration, Rhythm And Depth] : normal respiratory rhythm and effort [Exaggerated Use Of Accessory Muscles For Inspiration] : no accessory muscle use [Left Infraclavicular] : left infraclavicular area [Clean] : clean [Dry] : dry [Well-Healed] : well-healed [] : no ischemic changes [FreeTextEntry1] : anxious [Bleeding] : no active bleeding [Palpable Crepitus] : no palpable crepitus [Foul Odor] : no foul smell [Serosanguineous Drainage] : no serosanquineous drainage [Purulent Drainage] : no purulent drainage [Serous Drainage] : no serous drainage [Erythema] : not erythematous [Warm] : not warm [Tender] : not tender [Fluctuant] : not fluctuant [Indurated] : not indurated

## 2019-06-10 NOTE — DISCUSSION/SUMMARY
[AICD Function Normal] : normal AICD function [FreeTextEntry1] : Ms. Hood is a pleasant 73 year old female with HTN, HLD, hypothyroidism, CAD s/p stents and ischemic cardiomyopathy s/p ICD and LBBB s/p upgrade to a BiV ICD 11/26/18 and recently diagnosed paroxysmal atrial fibrillation, who now presents for follow up.  ICD interrogation reveals normal function  All measured data is within normal limits.  She notes a significant improvement in the way she feels since upgrade.  No further afib since her last check and she is maintained on Eliquis.   She will follow up in 6 months or sooner if needed and knows to call with any questions or concerns.  \par

## 2019-09-05 ENCOUNTER — OUTPATIENT (OUTPATIENT)
Dept: OUTPATIENT SERVICES | Facility: HOSPITAL | Age: 74
LOS: 1 days | Discharge: HOME | End: 2019-09-05

## 2019-09-05 DIAGNOSIS — Z95.810 PRESENCE OF AUTOMATIC (IMPLANTABLE) CARDIAC DEFIBRILLATOR: Chronic | ICD-10-CM

## 2019-10-03 ENCOUNTER — OUTPATIENT (OUTPATIENT)
Dept: OUTPATIENT SERVICES | Facility: HOSPITAL | Age: 74
LOS: 1 days | Discharge: HOME | End: 2019-10-03

## 2019-10-03 DIAGNOSIS — Z95.810 PRESENCE OF AUTOMATIC (IMPLANTABLE) CARDIAC DEFIBRILLATOR: Chronic | ICD-10-CM

## 2019-12-04 ENCOUNTER — APPOINTMENT (OUTPATIENT)
Dept: HEART AND VASCULAR | Facility: CLINIC | Age: 74
End: 2019-12-04
Payer: MEDICARE

## 2019-12-04 VITALS
HEART RATE: 65 BPM | WEIGHT: 136 LBS | SYSTOLIC BLOOD PRESSURE: 130 MMHG | DIASTOLIC BLOOD PRESSURE: 74 MMHG | BODY MASS INDEX: 23.22 KG/M2 | HEIGHT: 64 IN

## 2019-12-04 PROCEDURE — 93284 PRGRMG EVAL IMPLANTABLE DFB: CPT

## 2019-12-10 NOTE — PHYSICAL EXAM
[General Appearance - Well Developed] : well developed [Normal Appearance] : normal appearance [No Deformities] : no deformities [General Appearance - Well Nourished] : well nourished [Well Groomed] : well groomed [General Appearance - In No Acute Distress] : no acute distress [Heart Rate And Rhythm] : heart rate and rhythm were normal [Heart Sounds] : normal S1 and S2 [Edema] : no peripheral edema present [] : no respiratory distress [Respiration, Rhythm And Depth] : normal respiratory rhythm and effort [Exaggerated Use Of Accessory Muscles For Inspiration] : no accessory muscle use [Left Infraclavicular] : left infraclavicular area [Dry] : dry [Well-Healed] : well-healed [Clean] : clean [FreeTextEntry1] : anxious [Palpable Crepitus] : no palpable crepitus [Purulent Drainage] : no purulent drainage [Bleeding] : no active bleeding [Foul Odor] : no foul smell [Serosanguineous Drainage] : no serosanquineous drainage [Serous Drainage] : no serous drainage [Erythema] : not erythematous [Tender] : not tender [Warm] : not warm [Fluctuant] : not fluctuant [Indurated] : not indurated

## 2019-12-10 NOTE — REVIEW OF SYSTEMS
[see HPI] : see HPI [Negative] : Heme/Lymph [Fever] : no fever [Feeling Fatigued] : not feeling fatigued [Chills] : no chills [Shortness Of Breath] : no shortness of breath [Chest  Pressure] : no chest pressure [Dyspnea on exertion] : not dyspnea during exertion [Lower Ext Edema] : no extremity edema [Chest Pain] : no chest pain [Palpitations] : no palpitations [Cough] : no cough [Coughing Up Blood] : no hemoptysis

## 2019-12-10 NOTE — PROCEDURE
[NSR] : normal sinus rhythm [No] : not [DDD] : DDD [Threshold Testing Performed] : Threshold testing was performed [CRT-D] : Cardiac resynchronization therapy defibrillator [Sensing Amplitude ___mv] : sensing amplitude was [unfilled] mv [Lead Imp:  ___ohms] : lead impedance was [unfilled] ohms [___V @] : [unfilled] V [___ ms] : [unfilled] ms [None] : none [Asense-Vsense ___ %] : Asense-Vsense [unfilled]% [Asense-Vpace ___ %] : Asense-Vpace [unfilled]% [Apace-Vsense ___ %] : Apace-Vsense [unfilled]% [Apace-Vpace ___ %] : Apace-Vpace [unfilled]% [de-identified] : Medtronic [de-identified] : [id number] [de-identified] : Sasha NAVAS [de-identified] : 60/120 [de-identified] : 11/26/18 [de-identified] : shock impedance 47 [de-identified] : 8.3 years [de-identified] : no recurrent afib \par optivol below threshold / stable

## 2019-12-10 NOTE — HISTORY OF PRESENT ILLNESS
[Palpitations] : no palpitations [SOB] : no dyspnea [Syncope] : no syncope [Dizziness] : no dizziness [Chest Pain] : no chest pain or discomfort [Pain at Site] : no pain at device site [Shoulder Pain] : no shoulder pain [ICD Shocks] : no recent ICD shocks [Swelling at Site] : no swelling at device site [Erythema at Site] : no erythema at device site [de-identified] : Ms. Hood is a pleasant 74 year old female with HTN, HLD, hypothyroidism, CAD s/p stents and ischemic cardiomyopathy s/p ICD and LBBB s/p upgrade to a BiV ICD 11/26/18 and recently diagnosed paroxysmal atrial fibrillation, who now presents for follow up\par \par She has a history of CAD and stents  >12 years ago.  Since then was told she had a depressed EF and had an ICD placed in 2005.  She was seen in our office 10/18 for a routine ICD check and noted that she has increased fatigue and SOB.  She saw her cardiologist and was told she had severe MR and has an appointment to see a valve doctor.  She was found to have a new LBBB and lower extremity edema.  We put her on one week of lasix and we obtained her echocardiogram to see if she would be a candidate for a BiV upgrade.  \par \par She underwent a upgrade to a BIV ICD 11/2018.  Ever since then she notes more energy, less SOB and no LE edema.  No device related complaints.  No syncope, near syncope, palpitations chest pain or orthopnea.   She is maintained on Eliquis without issues.  \par

## 2019-12-10 NOTE — DISCUSSION/SUMMARY
[AICD Function Normal] : normal AICD function [FreeTextEntry1] : Ms. Hood is a pleasant 74 year old female with HTN, HLD, hypothyroidism, CAD s/p stents and ischemic cardiomyopathy s/p ICD and LBBB s/p upgrade to a BiV ICD 11/26/18 and recently diagnosed paroxysmal atrial fibrillation, who now presents for follow up.  ICD interrogation reveals normal function  All measured data is within normal limits.  She notes a significant improvement in the way she feels since upgrade.  No further afib since her last check and she is maintained on Eliquis.   She will follow up in 6 months or sooner if needed and knows to call with any questions or concerns.  \par

## 2020-04-29 ENCOUNTER — RX RENEWAL (OUTPATIENT)
Age: 75
End: 2020-04-29

## 2020-06-03 ENCOUNTER — APPOINTMENT (OUTPATIENT)
Dept: HEART AND VASCULAR | Facility: CLINIC | Age: 75
End: 2020-06-03

## 2020-11-29 ENCOUNTER — FORM ENCOUNTER (OUTPATIENT)
Age: 75
End: 2020-11-29

## 2021-06-01 ENCOUNTER — RX RENEWAL (OUTPATIENT)
Age: 76
End: 2021-06-01

## 2021-09-07 ENCOUNTER — RX RENEWAL (OUTPATIENT)
Age: 76
End: 2021-09-07

## 2021-09-07 RX ORDER — APIXABAN 5 MG/1
5 TABLET, FILM COATED ORAL
Qty: 180 | Refills: 3 | Status: ACTIVE | COMMUNITY
Start: 2019-04-03 | End: 1900-01-01

## 2021-09-08 ENCOUNTER — APPOINTMENT (OUTPATIENT)
Dept: HEART AND VASCULAR | Facility: CLINIC | Age: 76
End: 2021-09-08
Payer: MEDICARE

## 2021-09-08 ENCOUNTER — NON-APPOINTMENT (OUTPATIENT)
Age: 76
End: 2021-09-08

## 2021-09-08 PROCEDURE — 93294 REM INTERROG EVL PM/LDLS PM: CPT

## 2021-09-08 PROCEDURE — 93296 REM INTERROG EVL PM/IDS: CPT

## 2021-09-08 PROCEDURE — 93295 DEV INTERROG REMOTE 1/2/MLT: CPT

## 2021-09-22 ENCOUNTER — APPOINTMENT (OUTPATIENT)
Dept: HEART AND VASCULAR | Facility: CLINIC | Age: 76
End: 2021-09-22
Payer: MEDICARE

## 2021-09-22 VITALS
HEIGHT: 64 IN | DIASTOLIC BLOOD PRESSURE: 68 MMHG | WEIGHT: 136 LBS | SYSTOLIC BLOOD PRESSURE: 129 MMHG | HEART RATE: 71 BPM | TEMPERATURE: 97.7 F | BODY MASS INDEX: 23.22 KG/M2

## 2021-09-22 PROCEDURE — 99212 OFFICE O/P EST SF 10 MIN: CPT | Mod: 25

## 2021-09-22 PROCEDURE — 93290 INTERROG DEV EVAL ICPMS IP: CPT | Mod: 26

## 2021-09-22 PROCEDURE — 93284 PRGRMG EVAL IMPLANTABLE DFB: CPT

## 2021-09-28 NOTE — HISTORY OF PRESENT ILLNESS
[Palpitations] : no palpitations [SOB] : no dyspnea [Syncope] : no syncope [Dizziness] : no dizziness [Chest Pain] : no chest pain or discomfort [ICD Shocks] : no recent ICD shocks [Shoulder Pain] : no shoulder pain [Pain at Site] : no pain at device site [Erythema at Site] : no erythema at device site [Swelling at Site] : no swelling at device site [de-identified] : Ms. Hood is a pleasant 76 year old female with HTN, HLD, hypothyroidism, CAD s/p stents and ischemic cardiomyopathy s/p ICD and LBBB s/p upgrade to a BiV ICD 11/26/18 and recently diagnosed paroxysmal atrial fibrillation, who now presents for follow up\par \par She has a history of CAD and stents  >12 years ago.  Since then was told she had a depressed EF and had an ICD placed in 2005.  She was seen in our office 10/18 for a routine ICD check and noted that she has increased fatigue and SOB.  She saw her cardiologist and was told she had severe MR and has an appointment to see a valve doctor.  She was found to have a new LBBB and lower extremity edema.  We put her on one week of lasix and we obtained her echocardiogram to see if she would be a candidate for a BiV upgrade.  \par \par She underwent a upgrade to a BIV ICD 11/2018.  Ever since then she notes more energy, less SOB and improved LE edema.  No device related complaints.  Since the pandemic some LIM when walking up hills but this is getting better the more she does it.  No syncope, near syncope, palpitations chest pain or orthopnea.   She is maintained on Eliquis without issues.  \par

## 2021-09-28 NOTE — DISCUSSION/SUMMARY
[AICD Function Normal] : normal AICD function [FreeTextEntry1] : Ms. Hood is a pleasant 76 year old female with HTN, HLD, hypothyroidism, CAD s/p stents and ischemic cardiomyopathy s/p ICD and LBBB s/p upgrade to a BiV ICD 11/26/18 and paroxysmal atrial fibrillation, who now presents for follow up.  ICD interrogation reveals normal function  All measured data is within normal limits.  She has LIM that sounds like deconditioning when walking up hills secondary to inactivity during COVID.  optivol stable/below threshold.  She will follow up with her cardiologist for further assessment.  No further afib since her last check and she is maintained on Eliquis.   She will follow up in 6 months or sooner if needed and knows to call with any questions or concerns.  \par

## 2021-09-28 NOTE — PROCEDURE
[No] : not [NSR] : normal sinus rhythm [CRT-D] : Cardiac resynchronization therapy defibrillator [DDD] : DDD [Threshold Testing Performed] : Threshold testing was performed [Sensing Amplitude ___mv] : sensing amplitude was [unfilled] mv [Lead Imp:  ___ohms] : lead impedance was [unfilled] ohms [___V @] : [unfilled] V [___ ms] : [unfilled] ms [None] : none [Asense-Vsense ___ %] : Asense-Vsense [unfilled]% [Asense-Vpace ___ %] : Asense-Vpace [unfilled]% [Apace-Vsense ___ %] : Apace-Vsense [unfilled]% [Apace-Vpace ___ %] : Apace-Vpace [unfilled]% [de-identified] : Medtronic [de-identified] : Sasha NAVAS [de-identified] : [id number] [de-identified] : 11/26/18 [de-identified] : 60/120 [de-identified] : shock impedance 48 [de-identified] : 5 years [de-identified] : no recurrent afib \par optivol below threshold / stable

## 2021-09-28 NOTE — PHYSICAL EXAM
[General Appearance - Well Developed] : well developed [Normal Appearance] : normal appearance [Well Groomed] : well groomed [General Appearance - Well Nourished] : well nourished [No Deformities] : no deformities [General Appearance - In No Acute Distress] : no acute distress [Heart Rate And Rhythm] : heart rate and rhythm were normal [Heart Sounds] : normal S1 and S2 [Edema] : no peripheral edema present [] : no respiratory distress [Respiration, Rhythm And Depth] : normal respiratory rhythm and effort [Exaggerated Use Of Accessory Muscles For Inspiration] : no accessory muscle use [Left Infraclavicular] : left infraclavicular area [Clean] : clean [Dry] : dry [Well-Healed] : well-healed [FreeTextEntry1] : anxious [Auscultation Breath Sounds / Voice Sounds] : lungs were clear to auscultation bilaterally [Palpable Crepitus] : no palpable crepitus [Bleeding] : no active bleeding [Foul Odor] : no foul smell [Purulent Drainage] : no purulent drainage [Serosanguineous Drainage] : no serosanquineous drainage [Serous Drainage] : no serous drainage [Erythema] : not erythematous [Warm] : not warm [Tender] : not tender [Indurated] : not indurated [Fluctuant] : not fluctuant

## 2021-09-28 NOTE — REVIEW OF SYSTEMS
[Fever] : no fever [Chills] : no chills [Feeling Fatigued] : not feeling fatigued [SOB] : no shortness of breath [Dyspnea on exertion] : dyspnea during exertion [Chest Discomfort] : no chest discomfort [Palpitations] : no palpitations [Syncope] : no syncope [Cough] : no cough [Negative] : Heme/Lymph

## 2021-12-22 ENCOUNTER — APPOINTMENT (OUTPATIENT)
Dept: HEART AND VASCULAR | Facility: CLINIC | Age: 76
End: 2021-12-22
Payer: MEDICARE

## 2021-12-22 ENCOUNTER — NON-APPOINTMENT (OUTPATIENT)
Age: 76
End: 2021-12-22

## 2021-12-22 PROCEDURE — 93296 REM INTERROG EVL PM/IDS: CPT | Mod: NC

## 2021-12-22 PROCEDURE — 93295 DEV INTERROG REMOTE 1/2/MLT: CPT | Mod: NC

## 2022-03-23 ENCOUNTER — APPOINTMENT (OUTPATIENT)
Dept: HEART AND VASCULAR | Facility: CLINIC | Age: 77
End: 2022-03-23
Payer: MEDICARE

## 2022-03-23 ENCOUNTER — NON-APPOINTMENT (OUTPATIENT)
Age: 77
End: 2022-03-23

## 2022-03-23 PROCEDURE — 93295 DEV INTERROG REMOTE 1/2/MLT: CPT

## 2022-03-23 PROCEDURE — 93296 REM INTERROG EVL PM/IDS: CPT

## 2022-06-30 ENCOUNTER — FORM ENCOUNTER (OUTPATIENT)
Age: 77
End: 2022-06-30

## 2022-07-29 ENCOUNTER — APPOINTMENT (OUTPATIENT)
Dept: HEART AND VASCULAR | Facility: CLINIC | Age: 77
End: 2022-07-29

## 2022-09-02 ENCOUNTER — APPOINTMENT (OUTPATIENT)
Dept: HEART AND VASCULAR | Facility: CLINIC | Age: 77
End: 2022-09-02

## 2022-10-28 ENCOUNTER — APPOINTMENT (OUTPATIENT)
Dept: HEART AND VASCULAR | Facility: CLINIC | Age: 77
End: 2022-10-28

## 2022-10-30 ENCOUNTER — FORM ENCOUNTER (OUTPATIENT)
Age: 77
End: 2022-10-30

## 2023-06-08 NOTE — PATIENT PROFILE ADULT - NSTRANSFEREYEGLASSESPAIRS_GEN_A_NUR
1 pair
Patient will perform bed<>chair transfer independently with use of appropriate assistive device by discharge.